# Patient Record
Sex: MALE | Race: BLACK OR AFRICAN AMERICAN | NOT HISPANIC OR LATINO | URBAN - NONMETROPOLITAN AREA
[De-identification: names, ages, dates, MRNs, and addresses within clinical notes are randomized per-mention and may not be internally consistent; named-entity substitution may affect disease eponyms.]

---

## 2018-01-01 ENCOUNTER — HOSPITAL ENCOUNTER (EMERGENCY)
Facility: HOSPITAL | Age: 43
End: 2018-05-08
Attending: EMERGENCY MEDICINE | Admitting: EMERGENCY MEDICINE

## 2018-01-01 DIAGNOSIS — I46.9 CARDIOPULMONARY ARREST (HCC): Primary | ICD-10-CM

## 2018-01-01 DIAGNOSIS — S39.91XA BLUNT TRAUMA TO ABDOMEN, INITIAL ENCOUNTER: ICD-10-CM

## 2018-01-01 PROCEDURE — 92950 HEART/LUNG RESUSCITATION CPR: CPT

## 2018-01-01 PROCEDURE — 94799 UNLISTED PULMONARY SVC/PX: CPT

## 2018-01-01 PROCEDURE — 25010000002 EPINEPHRINE PF 1 MG/10ML SOLUTION PREFILLED SYRINGE: Performed by: EMERGENCY MEDICINE

## 2018-01-01 PROCEDURE — 99284 EMERGENCY DEPT VISIT MOD MDM: CPT

## 2018-01-01 RX ADMIN — EPINEPHRINE 1 MG: 0.1 INJECTION, SOLUTION ENDOTRACHEAL; INTRACARDIAC; INTRAVENOUS at 20:50

## 2018-05-09 ENCOUNTER — HOSPITAL ENCOUNTER (EMERGENCY)
Facility: HOSPITAL | Age: 43
End: 2018-05-08

## 2018-05-09 VITALS — WEIGHT: 220 LBS | TEMPERATURE: 96.6 F | HEIGHT: 70 IN | BODY MASS INDEX: 31.5 KG/M2

## 2018-05-09 NOTE — ED NOTES
Gene Condon,  permits cleaning the patient before family comes to see the patient. Provided the patient with clean gown and linens. Cleaned the room. IV line and ET tube remain in place per  wishes.      Kayla Quiñones RN  05/09/18 0637

## 2018-05-09 NOTE — ED NOTES
Pt arrived by [facility] with CPR ongoing at this time with P being manually resuscitated with ambu bag, equal rise of fall of chest noted with resuscitation efforts.      Saloni Harden RN  05/08/18 1752

## 2018-05-09 NOTE — ED NOTES
Gene Condon, [facility] coroner has arrived to ED at this time.     Kayla Quiñones RN  05/09/18 0603
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
## 2018-05-09 NOTE — ED NOTES
Spoke with Gene Condon,  at this time who reports that he is on his way to the facility.      Kayla Quiñones RN  05/08/18 8084

## 2018-05-09 NOTE — ED NOTES
[employer] representative, Lauren Sturgeon speaking with the patients wife at this time about organ donation. Patients wife reports that she would like to wait and speak to the rest of the pts family. [employer] representative reports to allow the patient to leave ED with  as planned, that they would follow up with the patients wife in 2 hours for her decision. Gene Condon was made aware of this and verbalizes understanding. Patients wife signed provisional at this time and all the patients belongings were given to her.     Kayla Quiñones RN  05/09/18 0684

## 2018-05-09 NOTE — ED NOTES
Spoke with Lauren Sturgeon from [facility] at this time who states that she will follow up with us once we hear back from the . Case ID number [id number].      Kayla Quiñones RN  05/08/18 8081

## 2018-05-09 NOTE — ED NOTES
Time of death called by Dr. Rasmussen at this time. Patient is unresponsive to verbal/painful stimuli, no return of spontaneous respirations noted, pupils are fixed and dilated, no palpable pulses, asystole on the monitor.        Saloni Harden RN  05/08/18 2126

## 2018-05-09 NOTE — ED NOTES
Pt belongings:  Gold necklace, gold ring with diamonds, silver pocket knife, blue lighter, 8-$20's, 4- $1's, 1-$10, red tennis shoes, camo shorts, all given to wife.      Saloni Harden, RN  05/08/18 7437

## 2018-05-09 NOTE — ED NOTES
Patients wife was notified of the patients death by Dr Rasmussen with myself and lead RN, Jolie Zavala in hospital [facility]. Emotional support was provided. Patients family awaiting in chap. Denies any needs     Kayla Quiñones RN  05/09/18 3732

## 2018-05-09 NOTE — ED NOTES
Patients family is at bedside visiting the patient. Emotional support provided.      Kayla Quiñones RN  05/09/18 0695
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
